# Patient Record
Sex: FEMALE | Race: WHITE | NOT HISPANIC OR LATINO | URBAN - METROPOLITAN AREA
[De-identification: names, ages, dates, MRNs, and addresses within clinical notes are randomized per-mention and may not be internally consistent; named-entity substitution may affect disease eponyms.]

---

## 2017-01-01 ENCOUNTER — INPATIENT (INPATIENT)
Facility: HOSPITAL | Age: 82
LOS: 1 days | DRG: 199 | End: 2017-07-09
Attending: INTERNAL MEDICINE | Admitting: INTERNAL MEDICINE
Payer: MEDICARE

## 2017-01-01 VITALS — RESPIRATION RATE: 16 BRPM | SYSTOLIC BLOOD PRESSURE: 100 MMHG | DIASTOLIC BLOOD PRESSURE: 70 MMHG | HEART RATE: 85 BPM

## 2017-01-01 VITALS — TEMPERATURE: 98 F | RESPIRATION RATE: 20 BRPM

## 2017-01-01 DIAGNOSIS — J94.2 HEMOTHORAX: ICD-10-CM

## 2017-01-01 DIAGNOSIS — R06.02 SHORTNESS OF BREATH: ICD-10-CM

## 2017-01-01 DIAGNOSIS — Z51.5 ENCOUNTER FOR PALLIATIVE CARE: ICD-10-CM

## 2017-01-01 DIAGNOSIS — I10 ESSENTIAL (PRIMARY) HYPERTENSION: ICD-10-CM

## 2017-01-01 DIAGNOSIS — I48.91 UNSPECIFIED ATRIAL FIBRILLATION: ICD-10-CM

## 2017-01-01 DIAGNOSIS — R53.81 OTHER MALAISE: ICD-10-CM

## 2017-01-01 DIAGNOSIS — I50.9 HEART FAILURE, UNSPECIFIED: ICD-10-CM

## 2017-01-01 LAB
ALBUMIN SERPL ELPH-MCNC: 1.9 G/DL — LOW (ref 3.3–5)
ALP SERPL-CCNC: 120 U/L — SIGNIFICANT CHANGE UP (ref 40–120)
ALT FLD-CCNC: 20 U/L RC — SIGNIFICANT CHANGE UP (ref 10–45)
ANION GAP SERPL CALC-SCNC: 18 MMOL/L — HIGH (ref 5–17)
APTT BLD: 39.8 SEC — HIGH (ref 27.5–37.4)
AST SERPL-CCNC: 46 U/L — HIGH (ref 10–40)
BASE EXCESS BLDV CALC-SCNC: -3 MMOL/L — LOW (ref -2–2)
BASOPHILS # BLD AUTO: 0 K/UL — SIGNIFICANT CHANGE UP (ref 0–0.2)
BASOPHILS NFR BLD AUTO: 0.1 % — SIGNIFICANT CHANGE UP (ref 0–2)
BILIRUB SERPL-MCNC: 0.4 MG/DL — SIGNIFICANT CHANGE UP (ref 0.2–1.2)
BLD GP AB SCN SERPL QL: NEGATIVE — SIGNIFICANT CHANGE UP
BUN SERPL-MCNC: 83 MG/DL — HIGH (ref 7–23)
CA-I SERPL-SCNC: 1.13 MMOL/L — SIGNIFICANT CHANGE UP (ref 1.12–1.3)
CALCIUM SERPL-MCNC: 8.2 MG/DL — LOW (ref 8.4–10.5)
CHLORIDE BLDV-SCNC: 102 MMOL/L — SIGNIFICANT CHANGE UP (ref 96–108)
CHLORIDE SERPL-SCNC: 97 MMOL/L — SIGNIFICANT CHANGE UP (ref 96–108)
CO2 BLDV-SCNC: 23 MMOL/L — SIGNIFICANT CHANGE UP (ref 22–30)
CO2 SERPL-SCNC: 18 MMOL/L — LOW (ref 22–31)
CREAT SERPL-MCNC: 2.41 MG/DL — HIGH (ref 0.5–1.3)
EOSINOPHIL # BLD AUTO: 0 K/UL — SIGNIFICANT CHANGE UP (ref 0–0.5)
EOSINOPHIL NFR BLD AUTO: 0.3 % — SIGNIFICANT CHANGE UP (ref 0–6)
GAS PNL BLDV: 133 MMOL/L — LOW (ref 136–145)
GAS PNL BLDV: SIGNIFICANT CHANGE UP
GAS PNL BLDV: SIGNIFICANT CHANGE UP
GLUCOSE BLDV-MCNC: 81 MG/DL — SIGNIFICANT CHANGE UP (ref 70–99)
GLUCOSE SERPL-MCNC: 77 MG/DL — SIGNIFICANT CHANGE UP (ref 70–99)
HCO3 BLDV-SCNC: 22 MMOL/L — SIGNIFICANT CHANGE UP (ref 21–29)
HCT VFR BLD CALC: 36 % — SIGNIFICANT CHANGE UP (ref 34.5–45)
HCT VFR BLDA CALC: 36 % — LOW (ref 39–50)
HGB BLD CALC-MCNC: 11.6 G/DL — SIGNIFICANT CHANGE UP (ref 11.5–15.5)
HGB BLD-MCNC: 12 G/DL — SIGNIFICANT CHANGE UP (ref 11.5–15.5)
INR BLD: 1.88 RATIO — HIGH (ref 0.88–1.16)
LACTATE BLDV-MCNC: 2 MMOL/L — SIGNIFICANT CHANGE UP (ref 0.7–2)
LYMPHOCYTES # BLD AUTO: 1.8 K/UL — SIGNIFICANT CHANGE UP (ref 1–3.3)
LYMPHOCYTES # BLD AUTO: 16 % — SIGNIFICANT CHANGE UP (ref 13–44)
MCHC RBC-ENTMCNC: 31 PG — SIGNIFICANT CHANGE UP (ref 27–34)
MCHC RBC-ENTMCNC: 33.3 GM/DL — SIGNIFICANT CHANGE UP (ref 32–36)
MCV RBC AUTO: 93.1 FL — SIGNIFICANT CHANGE UP (ref 80–100)
MONOCYTES # BLD AUTO: 0.3 K/UL — SIGNIFICANT CHANGE UP (ref 0–0.9)
MONOCYTES NFR BLD AUTO: 2.5 % — SIGNIFICANT CHANGE UP (ref 2–14)
NEUTROPHILS # BLD AUTO: 9.2 K/UL — HIGH (ref 1.8–7.4)
NEUTROPHILS NFR BLD AUTO: 81.2 % — HIGH (ref 43–77)
PCO2 BLDV: 40 MMHG — SIGNIFICANT CHANGE UP (ref 35–50)
PH BLDV: 7.35 — SIGNIFICANT CHANGE UP (ref 7.35–7.45)
PLAT MORPH BLD: NORMAL — SIGNIFICANT CHANGE UP
PLATELET # BLD AUTO: 525 K/UL — HIGH (ref 150–400)
PO2 BLDV: 28 MMHG — SIGNIFICANT CHANGE UP (ref 25–45)
POTASSIUM BLDV-SCNC: 4.1 MMOL/L — SIGNIFICANT CHANGE UP (ref 3.5–5)
POTASSIUM SERPL-MCNC: 5 MMOL/L — SIGNIFICANT CHANGE UP (ref 3.5–5.3)
POTASSIUM SERPL-SCNC: 5 MMOL/L — SIGNIFICANT CHANGE UP (ref 3.5–5.3)
PROT SERPL-MCNC: 7.3 G/DL — SIGNIFICANT CHANGE UP (ref 6–8.3)
PROTHROM AB SERPL-ACNC: 20.6 SEC — HIGH (ref 9.8–12.7)
RBC # BLD: 3.86 M/UL — SIGNIFICANT CHANGE UP (ref 3.8–5.2)
RBC # FLD: 16.4 % — HIGH (ref 10.3–14.5)
RBC BLD AUTO: SIGNIFICANT CHANGE UP
RH IG SCN BLD-IMP: POSITIVE — SIGNIFICANT CHANGE UP
SAO2 % BLDV: 46 % — LOW (ref 67–88)
SODIUM SERPL-SCNC: 133 MMOL/L — LOW (ref 135–145)
WBC # BLD: 11.4 K/UL — HIGH (ref 3.8–10.5)
WBC # FLD AUTO: 11.4 K/UL — HIGH (ref 3.8–10.5)

## 2017-01-01 PROCEDURE — 99285 EMERGENCY DEPT VISIT HI MDM: CPT | Mod: 25

## 2017-01-01 PROCEDURE — 80053 COMPREHEN METABOLIC PANEL: CPT

## 2017-01-01 PROCEDURE — 84132 ASSAY OF SERUM POTASSIUM: CPT

## 2017-01-01 PROCEDURE — 85027 COMPLETE CBC AUTOMATED: CPT

## 2017-01-01 PROCEDURE — 96375 TX/PRO/DX INJ NEW DRUG ADDON: CPT

## 2017-01-01 PROCEDURE — 82330 ASSAY OF CALCIUM: CPT

## 2017-01-01 PROCEDURE — 83605 ASSAY OF LACTIC ACID: CPT

## 2017-01-01 PROCEDURE — 71010: CPT | Mod: 26

## 2017-01-01 PROCEDURE — 85730 THROMBOPLASTIN TIME PARTIAL: CPT

## 2017-01-01 PROCEDURE — 96374 THER/PROPH/DIAG INJ IV PUSH: CPT

## 2017-01-01 PROCEDURE — 84295 ASSAY OF SERUM SODIUM: CPT

## 2017-01-01 PROCEDURE — 99233 SBSQ HOSP IP/OBS HIGH 50: CPT | Mod: GC

## 2017-01-01 PROCEDURE — 99285 EMERGENCY DEPT VISIT HI MDM: CPT

## 2017-01-01 PROCEDURE — 85014 HEMATOCRIT: CPT

## 2017-01-01 PROCEDURE — 82435 ASSAY OF BLOOD CHLORIDE: CPT

## 2017-01-01 PROCEDURE — 82947 ASSAY GLUCOSE BLOOD QUANT: CPT

## 2017-01-01 PROCEDURE — 86850 RBC ANTIBODY SCREEN: CPT

## 2017-01-01 PROCEDURE — 86900 BLOOD TYPING SEROLOGIC ABO: CPT

## 2017-01-01 PROCEDURE — 85610 PROTHROMBIN TIME: CPT

## 2017-01-01 PROCEDURE — 86901 BLOOD TYPING SEROLOGIC RH(D): CPT

## 2017-01-01 PROCEDURE — 99238 HOSP IP/OBS DSCHRG MGMT 30/<: CPT

## 2017-01-01 PROCEDURE — 71045 X-RAY EXAM CHEST 1 VIEW: CPT

## 2017-01-01 PROCEDURE — 99223 1ST HOSP IP/OBS HIGH 75: CPT | Mod: GC

## 2017-01-01 PROCEDURE — 82803 BLOOD GASES ANY COMBINATION: CPT

## 2017-01-01 RX ORDER — ACETAMINOPHEN 500 MG
1000 TABLET ORAL ONCE
Qty: 0 | Refills: 0 | Status: COMPLETED | OUTPATIENT
Start: 2017-01-01 | End: 2017-01-01

## 2017-01-01 RX ORDER — HYDROMORPHONE HYDROCHLORIDE 2 MG/ML
0.2 INJECTION INTRAMUSCULAR; INTRAVENOUS; SUBCUTANEOUS EVERY 4 HOURS
Qty: 0 | Refills: 0 | Status: DISCONTINUED | OUTPATIENT
Start: 2017-01-01 | End: 2017-01-01

## 2017-01-01 RX ORDER — ONDANSETRON 8 MG/1
4 TABLET, FILM COATED ORAL EVERY 6 HOURS
Qty: 0 | Refills: 0 | Status: COMPLETED | OUTPATIENT
Start: 2017-01-01 | End: 2017-01-01

## 2017-01-01 RX ORDER — ONDANSETRON 8 MG/1
4 TABLET, FILM COATED ORAL EVERY 6 HOURS
Qty: 0 | Refills: 0 | Status: DISCONTINUED | OUTPATIENT
Start: 2017-01-01 | End: 2017-01-01

## 2017-01-01 RX ORDER — METOPROLOL TARTRATE 50 MG
0 TABLET ORAL
Qty: 0 | Refills: 0 | COMMUNITY

## 2017-01-01 RX ORDER — FUROSEMIDE 40 MG
1 TABLET ORAL
Qty: 0 | Refills: 0 | COMMUNITY

## 2017-01-01 RX ORDER — HYDROMORPHONE HYDROCHLORIDE 2 MG/ML
0.5 INJECTION INTRAMUSCULAR; INTRAVENOUS; SUBCUTANEOUS
Qty: 0 | Refills: 0 | Status: DISCONTINUED | OUTPATIENT
Start: 2017-01-01 | End: 2017-01-01

## 2017-01-01 RX ORDER — PANTOPRAZOLE SODIUM 20 MG/1
80 TABLET, DELAYED RELEASE ORAL ONCE
Qty: 0 | Refills: 0 | Status: COMPLETED | OUTPATIENT
Start: 2017-01-01 | End: 2017-01-01

## 2017-01-01 RX ORDER — HYDROMORPHONE HYDROCHLORIDE 2 MG/ML
0.5 INJECTION INTRAMUSCULAR; INTRAVENOUS; SUBCUTANEOUS
Qty: 100 | Refills: 0 | Status: DISCONTINUED | OUTPATIENT
Start: 2017-01-01 | End: 2017-01-01

## 2017-01-01 RX ORDER — IBUPROFEN 200 MG
1 TABLET ORAL
Qty: 0 | Refills: 0 | COMMUNITY
Start: 2017-01-01 | End: 2017-07-13

## 2017-01-01 RX ORDER — PROTHROMBIN COMPLEX CONCENTRATE (HUMAN) 25.5; 16.5; 24; 22; 22; 26 [IU]/ML; [IU]/ML; [IU]/ML; [IU]/ML; [IU]/ML; [IU]/ML
1500 POWDER, FOR SOLUTION INTRAVENOUS ONCE
Qty: 1500 | Refills: 0 | Status: DISCONTINUED | OUTPATIENT
Start: 2017-01-01 | End: 2017-01-01

## 2017-01-01 RX ORDER — DOCUSATE SODIUM 100 MG
0 CAPSULE ORAL
Qty: 0 | Refills: 0 | COMMUNITY

## 2017-01-01 RX ORDER — FUROSEMIDE 40 MG
0 TABLET ORAL
Qty: 0 | Refills: 0 | COMMUNITY

## 2017-01-01 RX ORDER — ONDANSETRON 8 MG/1
4 TABLET, FILM COATED ORAL ONCE
Qty: 0 | Refills: 0 | Status: COMPLETED | OUTPATIENT
Start: 2017-01-01 | End: 2017-01-01

## 2017-01-01 RX ORDER — APIXABAN 2.5 MG/1
1 TABLET, FILM COATED ORAL
Qty: 0 | Refills: 0 | COMMUNITY

## 2017-01-01 RX ORDER — DILTIAZEM HCL 120 MG
1 CAPSULE, EXT RELEASE 24 HR ORAL
Qty: 0 | Refills: 0 | COMMUNITY

## 2017-01-01 RX ORDER — METOPROLOL TARTRATE 50 MG
1 TABLET ORAL
Qty: 0 | Refills: 0 | COMMUNITY

## 2017-01-01 RX ORDER — PROTHROMBIN COMPLEX CONCENTRATE (HUMAN) 25.5; 16.5; 24; 22; 22; 26 [IU]/ML; [IU]/ML; [IU]/ML; [IU]/ML; [IU]/ML; [IU]/ML
2050 POWDER, FOR SOLUTION INTRAVENOUS ONCE
Qty: 2050 | Refills: 0 | Status: DISCONTINUED | OUTPATIENT
Start: 2017-01-01 | End: 2017-01-01

## 2017-01-01 RX ORDER — APIXABAN 2.5 MG/1
0 TABLET, FILM COATED ORAL
Qty: 0 | Refills: 0 | COMMUNITY

## 2017-01-01 RX ORDER — PROTHROMBIN COMPLEX CONCENTRATE (HUMAN) 25.5; 16.5; 24; 22; 22; 26 [IU]/ML; [IU]/ML; [IU]/ML; [IU]/ML; [IU]/ML; [IU]/ML
2000 POWDER, FOR SOLUTION INTRAVENOUS ONCE
Qty: 2000 | Refills: 0 | Status: COMPLETED | OUTPATIENT
Start: 2017-01-01 | End: 2017-01-01

## 2017-01-01 RX ORDER — ACETAMINOPHEN WITH CODEINE 300MG-30MG
1 TABLET ORAL
Qty: 0 | Refills: 0 | COMMUNITY
Start: 2017-01-01 | End: 2017-07-13

## 2017-01-01 RX ORDER — SODIUM CHLORIDE 9 MG/ML
1000 INJECTION INTRAMUSCULAR; INTRAVENOUS; SUBCUTANEOUS ONCE
Qty: 0 | Refills: 0 | Status: COMPLETED | OUTPATIENT
Start: 2017-01-01 | End: 2017-01-01

## 2017-01-01 RX ORDER — ROBINUL 0.2 MG/ML
0.4 INJECTION INTRAMUSCULAR; INTRAVENOUS ONCE
Qty: 0 | Refills: 0 | Status: COMPLETED | OUTPATIENT
Start: 2017-01-01 | End: 2017-01-01

## 2017-01-01 RX ORDER — PANTOPRAZOLE SODIUM 20 MG/1
1 TABLET, DELAYED RELEASE ORAL
Qty: 0 | Refills: 0 | COMMUNITY
Start: 2017-01-01 | End: 2017-07-16

## 2017-01-01 RX ORDER — ACETAMINOPHEN 500 MG
2 TABLET ORAL
Qty: 0 | Refills: 0 | COMMUNITY

## 2017-01-01 RX ORDER — ACETAMINOPHEN 500 MG
650 TABLET ORAL EVERY 6 HOURS
Qty: 0 | Refills: 0 | Status: DISCONTINUED | OUTPATIENT
Start: 2017-01-01 | End: 2017-01-01

## 2017-01-01 RX ORDER — ROBINUL 0.2 MG/ML
0.4 INJECTION INTRAMUSCULAR; INTRAVENOUS EVERY 6 HOURS
Qty: 0 | Refills: 0 | Status: DISCONTINUED | OUTPATIENT
Start: 2017-01-01 | End: 2017-01-01

## 2017-01-01 RX ORDER — SODIUM CHLORIDE 9 MG/ML
1000 INJECTION INTRAMUSCULAR; INTRAVENOUS; SUBCUTANEOUS
Qty: 0 | Refills: 0 | Status: DISCONTINUED | OUTPATIENT
Start: 2017-01-01 | End: 2017-01-01

## 2017-01-01 RX ORDER — HYDROMORPHONE HYDROCHLORIDE 2 MG/ML
1 INJECTION INTRAMUSCULAR; INTRAVENOUS; SUBCUTANEOUS
Qty: 0 | Refills: 0 | Status: DISCONTINUED | OUTPATIENT
Start: 2017-01-01 | End: 2017-01-01

## 2017-01-01 RX ORDER — HYDROMORPHONE HYDROCHLORIDE 2 MG/ML
0.2 INJECTION INTRAMUSCULAR; INTRAVENOUS; SUBCUTANEOUS ONCE
Qty: 0 | Refills: 0 | Status: DISCONTINUED | OUTPATIENT
Start: 2017-01-01 | End: 2017-01-01

## 2017-01-01 RX ADMIN — HYDROMORPHONE HYDROCHLORIDE 0.2 MILLIGRAM(S): 2 INJECTION INTRAMUSCULAR; INTRAVENOUS; SUBCUTANEOUS at 15:29

## 2017-01-01 RX ADMIN — Medication 400 MILLIGRAM(S): at 13:01

## 2017-01-01 RX ADMIN — SODIUM CHLORIDE 75 MILLILITER(S): 9 INJECTION INTRAMUSCULAR; INTRAVENOUS; SUBCUTANEOUS at 07:40

## 2017-01-01 RX ADMIN — SODIUM CHLORIDE 75 MILLILITER(S): 9 INJECTION INTRAMUSCULAR; INTRAVENOUS; SUBCUTANEOUS at 04:44

## 2017-01-01 RX ADMIN — HYDROMORPHONE HYDROCHLORIDE 0.5 MG/HR: 2 INJECTION INTRAMUSCULAR; INTRAVENOUS; SUBCUTANEOUS at 11:42

## 2017-01-01 RX ADMIN — HYDROMORPHONE HYDROCHLORIDE 0.5 MILLIGRAM(S): 2 INJECTION INTRAMUSCULAR; INTRAVENOUS; SUBCUTANEOUS at 20:40

## 2017-01-01 RX ADMIN — Medication 0.25 MILLIGRAM(S): at 16:23

## 2017-01-01 RX ADMIN — HYDROMORPHONE HYDROCHLORIDE 0.5 MILLIGRAM(S): 2 INJECTION INTRAMUSCULAR; INTRAVENOUS; SUBCUTANEOUS at 01:19

## 2017-01-01 RX ADMIN — ONDANSETRON 4 MILLIGRAM(S): 8 TABLET, FILM COATED ORAL at 16:29

## 2017-01-01 RX ADMIN — HYDROMORPHONE HYDROCHLORIDE 0.5 MILLIGRAM(S): 2 INJECTION INTRAMUSCULAR; INTRAVENOUS; SUBCUTANEOUS at 08:16

## 2017-01-01 RX ADMIN — HYDROMORPHONE HYDROCHLORIDE 0.5 MG/HR: 2 INJECTION INTRAMUSCULAR; INTRAVENOUS; SUBCUTANEOUS at 20:08

## 2017-01-01 RX ADMIN — SODIUM CHLORIDE 75 MILLILITER(S): 9 INJECTION INTRAMUSCULAR; INTRAVENOUS; SUBCUTANEOUS at 21:00

## 2017-01-01 RX ADMIN — SODIUM CHLORIDE 75 MILLILITER(S): 9 INJECTION INTRAMUSCULAR; INTRAVENOUS; SUBCUTANEOUS at 19:59

## 2017-01-01 RX ADMIN — HYDROMORPHONE HYDROCHLORIDE 0.5 MILLIGRAM(S): 2 INJECTION INTRAMUSCULAR; INTRAVENOUS; SUBCUTANEOUS at 06:10

## 2017-01-01 RX ADMIN — ONDANSETRON 4 MILLIGRAM(S): 8 TABLET, FILM COATED ORAL at 15:28

## 2017-01-01 RX ADMIN — HYDROMORPHONE HYDROCHLORIDE 0.5 MILLIGRAM(S): 2 INJECTION INTRAMUSCULAR; INTRAVENOUS; SUBCUTANEOUS at 20:53

## 2017-01-01 RX ADMIN — PROTHROMBIN COMPLEX CONCENTRATE (HUMAN) 400 INTERNATIONAL UNIT(S): 25.5; 16.5; 24; 22; 22; 26 POWDER, FOR SOLUTION INTRAVENOUS at 15:31

## 2017-01-01 RX ADMIN — HYDROMORPHONE HYDROCHLORIDE 0.2 MILLIGRAM(S): 2 INJECTION INTRAMUSCULAR; INTRAVENOUS; SUBCUTANEOUS at 10:30

## 2017-01-01 RX ADMIN — ROBINUL 0.4 MILLIGRAM(S): 0.2 INJECTION INTRAMUSCULAR; INTRAVENOUS at 16:26

## 2017-01-01 RX ADMIN — SODIUM CHLORIDE 1000 MILLILITER(S): 9 INJECTION INTRAMUSCULAR; INTRAVENOUS; SUBCUTANEOUS at 13:40

## 2017-01-01 RX ADMIN — PANTOPRAZOLE SODIUM 80 MILLIGRAM(S): 20 TABLET, DELAYED RELEASE ORAL at 13:39

## 2017-01-01 RX ADMIN — HYDROMORPHONE HYDROCHLORIDE 0.5 MG/HR: 2 INJECTION INTRAMUSCULAR; INTRAVENOUS; SUBCUTANEOUS at 19:58

## 2017-01-01 RX ADMIN — HYDROMORPHONE HYDROCHLORIDE 0.5 MILLIGRAM(S): 2 INJECTION INTRAMUSCULAR; INTRAVENOUS; SUBCUTANEOUS at 01:04

## 2017-01-01 RX ADMIN — HYDROMORPHONE HYDROCHLORIDE 0.2 MILLIGRAM(S): 2 INJECTION INTRAMUSCULAR; INTRAVENOUS; SUBCUTANEOUS at 05:26

## 2017-07-07 NOTE — ED PROVIDER NOTE - CRITICAL CARE PROVIDED
consult w/ pt's family directly relating to pts condition/direct patient care (not related to procedure)/conducted a detailed discussion of DNR status

## 2017-07-07 NOTE — ED PROVIDER NOTE - PROGRESS NOTE DETAILS
ATTD: I spoke with son at the bedside and other son (who is a physician) by phone and discussed the patient. plan to reverse eliquis. not endosocopy at this time. ATTD: I spoke with son at the bedside and other son (who is a physician) by phone and discussed the patient. plan to reverse eliquis. not endosocopy at this time. Dr. Anil vo can be reached at [phone].

## 2017-07-07 NOTE — H&P ADULT - PROBLEM SELECTOR PLAN 6
pt with traumatic rib fractures and likely hemothorax to left side causing significant pain, dyspnea, and anxiety. pts goals are to be comfortable and not to pursue any further aggressive interventions according to her wishes. despite her pain and hypoxia, pt is alert and oriented and able to converse and answer all questions. 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 pt and family understand that her prognosis is likely hours to days due to the severity of her injury and her underlying illnesses.

## 2017-07-07 NOTE — H&P ADULT - NSHPLABSRESULTS_GEN_ALL_CORE
INTERPRETATION:  A single chest x-ray was obtained on July 7, 2017.    Indication: Fever. Rule out pneumonia.    Impression:    The heart is enlarged. Complete opacification left hemithorax secondary   to pleural effusion and collapse lung. The right lung is clear. No   previous films for comparison are available. If clinically warranted CT   scan should be obtained. A pacer is in good position.

## 2017-07-07 NOTE — ED PROVIDER NOTE - PHYSICAL EXAMINATION
Gen: AAO x 3, lethargic  Skin: No rashes or lesions  HEENT: NC/AT, PERRLA, EOMI, MMM.  dried blood around the mouth  Resp: unlabored. CTA on the right.  dull sounds to the left  Cardiac: rrr s1s2, no murmurs, rubs or gallops  GI: ND, +BS, Soft, NT  Ext: no pedal edema, FROM in all extremities  Neuro: no focal deficits

## 2017-07-07 NOTE — ED ADULT NURSE NOTE - CHIEF COMPLAINT
The patient is a 89y Female complaining of The patient is a 89y Female complaining of rib pain, vomiting blood

## 2017-07-07 NOTE — ED ADULT NURSE NOTE - PMH
Anemia    Atrial fibrillation    CAD (coronary artery disease)    CHF (congestive heart failure)    HTN (hypertension)    Osteoarthritis    Pacemaker    Septic arthritis

## 2017-07-07 NOTE — ED PROVIDER NOTE - MEDICAL DECISION MAKING DETAILS
ATTD: pain / bleeding / hypotension / and on anti coga. will check labs, xray, reversal of her eliquis given concern for lfe threatening bleeding. will give fluids, pain medication, transfuse prn. admit to  hospital for further care. I discussed with son her prognosis. she is DNR DNI and will continue as that.

## 2017-07-07 NOTE — ED ADULT NURSE NOTE - OBJECTIVE STATEMENT
Pt bib EMS from Assisted living facility for eval of continued rib pain and was noted to be vomiting blood this morning. Oral mucosa dry, cracked with dried blood evident on lips. Pt is alert and interactive.

## 2017-07-07 NOTE — H&P ADULT - NSHPPHYSICALEXAM_GEN_ALL_CORE
Physical Exam:   constitutional - ill appearing, awake and alert, oriented x3  head - no external evidence of trauma  cvs - rrr, no murmurs, mod peripheral edema  resp - breath sounds significantly diminished on left side, tachypnea, rales bilat, crackles left greater than right   gi - abdomen soft and nontender, no rigidity, guarding or rebound, bowel sounds present, healed surgical scars, abdominal hernia  msk - moving all extremities spontaneously  neuro - alert and oriented x3, no focal deficits, CNs 2-12 grossly intact  skin- no jaundice, warm and dry  psych - mood and affect wnl, no apparent risk to self or others

## 2017-07-07 NOTE — H&P ADULT - HISTORY OF PRESENT ILLNESS
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 pt was living at home until approx 6 months ago when she had an episode of pna and gastroenteritis leading to hospital admission. pt was subsequently weak and deconditioned after her prolonged hospitalization, thus was placed at [facility]. pt was doing well until 4 days ago when she began to complain of back pain for which the team at the [facility] ordered an xray. during the process of obtaining the xray, pt injured her left ribs. since then pt has been c/o left thoracic pain, sob, and intermittent hemoptysis. pt was evaluated in the ED and found to be hypotensive, hypoxic on 02 via NC, and tachypneic. xray showed complete atx of left lung likely secondary to pleural effusion ? possible hemothorax. discussions with family were had by ED MD as well as palliative care team in which family expressed goals for comfort measures; stated invasive procedures or further testing to elucidate whether component of hemothorax present was not within her goals of care. pt is being admitted to PCU for pain and sxs control for her significant pain and dyspnea with prognosis of likely days to weeks.

## 2017-07-07 NOTE — ED PROVIDER NOTE - OBJECTIVE STATEMENT
89 yo female with PMHx of CHF, Afib s/p PPM, HTN, CAD, anemia presenting from [facility] with rib pain and hematemesis. 89 yo female with PMHx of CHF, Afib s/p PPM on eliquis, HTN, CAD, anemia presenting from [place] with rib pain and hematemesis.  The patient was c/o left rib pain over the past 3 days.  had a chest xray as outpatient which showed rib fracture.  For the past day staff has noticed her vomiting up blood.  Denies head trauma, fall, fevers/chills, CP, SOB, abd pain. Patient is DNR/DNI.

## 2017-07-07 NOTE — H&P ADULT - ASSESSMENT
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 pt was living at home until approx 6 months ago when she had an episode of pna and gastroenteritis leading to hospital admission. pt was subsequently weak and deconditioned after her prolonged hospitalization, thus was placed at [facility]. pt was doing well until 4 days ago when she began to complain of back pain for which the team at the [facility] ordered an xray. during the process of obtaining the xray, pt injured her left ribs. since then pt has been c/o left thoracic pain, sob, and intermittent hemoptysis. pt was evaluated in the ED and found to be hypotensive, hypoxic on 02 via NC, and tachypneic. xray showed complete atx of left lung likely secondary to pleural effusion ? possible hemothorax. discussions with family were had by ED MD as well as palliative care team in which family expressed goals for comfort measures; stated invasive procedures or further testing to elucidate whether component of hemothorax present was not within her goals of care. pt is being admitted to PCU for pain and sxs control for her significant pain and dyspnea with prognosis of likely days to weeks.

## 2017-07-07 NOTE — H&P ADULT - PROBLEM SELECTOR PLAN 2
pt with chronic htn on toprol xl 100 mg qd, lasix 40 qd + lasix 40 / 20 alternating every other night, cardizem cd 360 mg qhs at baseline. these medications are being held at this time due to hypotension presumably from hemothorax.

## 2017-07-07 NOTE — H&P ADULT - NSHPREVIEWOFSYSTEMS_GEN_ALL_CORE
ROS:   constitutional - no fever, no chills  eyes - no visual changes, no redness  eent - no sore throat, no nasal congestion  cvs - no chest pain, no leg swelling  resp - + shortness of breath, no cough  gi - no abdominal pain, no vomiting, no diarrhea  gu - no dysuria, no hematuria  msk - no acute back pain, no joint swelling  skin - no rashes, no jaundice  neuro - no headache, no focal weakness  psych - no acute mental health issue

## 2017-07-07 NOTE — H&P ADULT - PROBLEM SELECTOR PLAN 1
pt with complete atx/pleural effusion and likely underlying hemothorax on left side secondary to traumatic rib fractures. pt with significant hypoxia, intermittent hemoptysis, and significant pain/dyspnea secondary to these issues. as per goals of care conversation - no further intervention or diagnostic imaging to further elucidate nature of her injuries.   - start dilaudid 0.2 mg iv q4 atc + dilaudid 0.5 mg iv q1 prn pain and/or dyspnea  - continue 02 via nc at 4L  - ativan 0.25 mg iv q6 prn anxiety and/or agitation

## 2017-07-07 NOTE — ED PROVIDER NOTE - ATTENDING CONTRIBUTION TO CARE
89 y/o f with pmhx Anemia, a fib on Eliquis, CAD, CHF, HTN, osteoarthritis, PPM presents from [facility] for concern for increase pain on ribs and oral bleeding. son at the bedside. no fever no chills. no abd pain. Had Xray done at assisted living which revealed rib fractures. no blood per rectum reported from NH.   Gen.mild distress from pain. no acute resp distress, Non toxic   HEENT: EOMI, mmm, dried blood on lips and tongue.   Lungs: CTAB/L no C/ W /R   CVS: S1S2   Abd; Soft non tender, non distended   Ext: no edema   Neuro AAOx3 non focal clear speech

## 2017-07-08 NOTE — PROGRESS NOTE ADULT - SUBJECTIVE AND OBJECTIVE BOX
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 pt was living at home until approx 6 months ago when she had an episode of pna and gastroenteritis leading to hospital admission. pt was subsequently weak and deconditioned after her prolonged hospitalization, thus was placed at [facility]. pt was doing well until 4 days ago when she began to complain of back pain for which the team at the care home ordered an xray. during the process of obtaining the xray, pt injured her left ribs. since then pt has been c/o left thoracic pain, sob, and intermittent hemoptysis. pt was evaluated in the ED and found to be hypotensive, hypoxic on 02 via NC, and tachypneic. xray showed complete atx of left lung likely secondary to pleural effusion ? possible hemothorax. discussions with family were had by ED MD as well as palliative care team in which family expressed goals for comfort measures; stated invasive procedures or further testing to elucidate whether component of hemothorax present was not within her goals of care. pt is being admitted to PCU for pain and sxs control for her significant pain and dyspnea with prognosis of likely days to weeks. (07 Jul 2017 18:49)    Indication for Palliative Care Unit Services: Used 4 PRN's in 24H; used PRN zofran for nausea and one ativan     ADVANCE DIRECTIVES:    DNR [x ] YES [ ] NO                            [ ] Completed  MOLST  [ ] YES [ x] NO                      [ ] Completed  Health Care Proxy [x ] YES  [ ] NO   [ ] Completed  Living Will  [x ] YES [ ] NO               Allergies    No Known Allergies    Intolerances        MEDICATIONS  (STANDING):  sodium chloride 0.9%. 1000 milliLiter(s) (75 mL/Hr) IV Continuous <Continuous>  HYDROmorphone Infusion 0.5 mG/Hr (0.5 mL/Hr) IV Continuous <Continuous>    MEDICATIONS  (PRN):  HYDROmorphone  Injectable 0.5 milliGRAM(s) IV Push every 1 hour PRN dyspnea  acetaminophen  Suppository 650 milliGRAM(s) Rectal every 6 hours PRN For Temp greater than 38 C (100.4 F)  glycopyrrolate Injectable 0.4 milliGRAM(s) IV Push every 6 hours PRN secretions  LORazepam   Injectable 0.5 milliGRAM(s) IV Push every 1 hour PRN anxiety and / or agitation  HYDROmorphone  Injectable 1 milliGRAM(s) IV Push every 1 hour PRN Breakthrough pain      PRESENT SYMPTOMS:  Source: [ ] Patient   [ ] Family   [x ] Team     Pain:                        [ x] No [ ] Yes             [ ] Mild [ ] Moderate [ ] Severe    Onset -  Location -  Duration -  Character -  Alleviating/Aggravating -  Radiation -  Timing -      Dyspnea:                [ ] No [ x] Yes             [ ] Mild [ ] Moderate [ x] Severe    Anxiety:                  [x ] No [ ] Yes             [ ] Mild [ ] Moderate [ ] Severe    Fatigue:                  [ ] No [x ] Yes             [ ] Mild [ ] Moderate [ x] Severe    Nausea:                  [x ] No [ ] Yes             [ ] Mild [ ] Moderate [ ] Severe    Loss of appetite:   [ ] No [ x] Yes             [ ] Mild [ ] Moderate [x ] Severe    Constipation:        [x ] No [ ] Yes             [ ] Mild [ ] Moderate [ ] Severe    Other Symptoms:  [ ] All other review of systems negative   [ x] Unable to obtain due to poor mentation     Karnofsky Performance Score/Palliative Performance Status Version 2:    20     %    PHYSICAL EXAM:  Vital Signs Last 24 Hrs  T(C): 36.8 (08 Jul 2017 08:34), Max: 36.8 (08 Jul 2017 08:34)  T(F): 98.3 (08 Jul 2017 08:34), Max: 98.3 (08 Jul 2017 08:34)  HR: 124 (08 Jul 2017 06:17) (100 - 124)  BP: 83/56 (07 Jul 2017 19:31) (83/56 - 85/48)  BP(mean): --  RR: 20 (08 Jul 2017 08:34) (20 - 20)  SpO2: 100% (08 Jul 2017 06:17) (92% - 100%) I&O's Summary    07 Jul 2017 07:01  -  08 Jul 2017 07:00  --------------------------------------------------------  IN: 2000 mL / OUT: 0 mL / NET: 2000 mL        General:  [ ] Alert  [ ] Oriented x      [ ] Lethargic  [ ] Agitated   [x ] Cachexia   [x ] Unarousable  [ ] Verbal  [ ] Non-Verbal    HEENT:  [x ] Normal   [ ] Dry mouth   [ ] ET Tube    [ ] Trach  [ ] Oral lesions    Lungs:   [ ] Clear [ x] Tachypnea  [ ] Audible excessive secretions   [ ] Rhonchi        [ ] Right [ ] Left [ ] Bilateral  [ ] Crackles        [ ] Right [ ] Left [ ] Bilateral  [ ] Wheezing     [ ] Right [ ] Left [ ] Bilateral  [ ] Respiratory failure [ ] Acute [ ] Chronic [ ] Hypoxemic [ ] Hypercarbic [ ] Other    Cardiovascular:   [x ] Regular [ ] Irregular [ ] Tachycardia   [ ] Bradycardia  [ ] Murmur [ ] Other  [ ] Shock [ ] Septic [ ] Hypovolemic [ ] Neurogenic [ ] Cardiogenic   [ ] Vasopressors [ ] Inotrophs    Abdomen:   [x ] Soft  [x ] Distended   [ ] +BS  [ ] Non tender [ ] Tender  [ ]PEG   [ ]OGT/ NGT   Last BM:     [ ] Other    Genitourinary:  [ ] Normal [ ] Incontinent   [ ] Oliguria/Anuria   [x ] Domingo  [ ] Other       Musculoskeletal:    [ ] Normal   [ ] Weakness  [ ] Edema  [ x] Bedbound/Wheelchair bound [ ]  Ambulatory [ ] with [ ] without assistance [ x] Functional quadriplegia    Neurological: [ ] No focal deficits  [ ] Cognitive impairment  [ ] Dysphagia [ ] Dysarthria [ ] Paresis [ ] Other   [ ] Brain compression  [ ] Cerebral edema [ x] Encephalopathy    Skin: [ ] Normal   [ ] Ulcer(s) type [ ] Diabetic [ ] Pressure [ ] Other   Stage        POA [ ]  Yes [ ]  No       [ ] Rash  Pt lower ext is mottled    LABS:                        12.0   11.4  )-----------( 525      ( 07 Jul 2017 13:00 )             36.0     07-07    133<L>  |  97  |  83<H>  ----------------------------<  77  5.0   |  18<L>  |  2.41<H>    Ca    8.2<L>      07 Jul 2017 13:00    TPro  7.3  /  Alb  1.9<L>  /  TBili  0.4  /  DBili  x   /  AST  46<H>  /  ALT  20  /  AlkPhos  120  07-07    PT/INR - ( 07 Jul 2017 13:00 )   PT: 20.6 sec;   INR: 1.88 ratio         PTT - ( 07 Jul 2017 13:00 )  PTT:39.8 sec      Protein Calorie Malnutrition: [ ] Mild [ ] Moderate [ ] Severe    Oral Intake: [ ] Unable/mouth care only [ ] Minimal [ ] Moderate [ ] Full Capability  Diet: [ ] NPO [ ] Tube feeds [ ] TPN [ ] Other     RADIOLOGY & ADDITIONAL STUDIES:    REFERRALS:   [ ] Chaplaincy  [ ] Hospice Care  [ ] Child Life  [ ] Social Work  [ ] Case management [ ] Nutrition [ ] Holistic Therapy [ ] Wound Care [ ]Physical Therapy [ ] Other [ ]See goals of care note in [place]

## 2017-07-08 NOTE — PROGRESS NOTE ADULT - ASSESSMENT
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 pt was living at home until approx 6 months ago when she had an episode of pna and gastroenteritis leading to hospital admission. pt was subsequently weak and deconditioned after her prolonged hospitalization, thus was placed at [facility]. pt was doing well until 4 days ago when she began to complain of back pain for which the team at the [facility] ordered an xray. during the process of obtaining the xray, pt injured her left ribs. since then pt has been c/o left thoracic pain, sob, and intermittent hemoptysis. pt was evaluated in the ED and found to be hypotensive, hypoxic on 02 via NC, and tachypneic. xray showed complete atx of left lung likely secondary to pleural effusion ? possible hemothorax. discussions with family were had by ED MD as well as palliative care team in which family expressed goals for comfort measures; stated invasive procedures or further testing to elucidate whether component of hemothorax present was not within her goals of care. pt is being admitted to PCU for pain and sxs control for her significant pain and dyspnea with prognosis of likely days to weeks.   Pt is actively dying

## 2017-07-09 NOTE — PROVIDER CONTACT NOTE (OTHER) - RECOMMENDATIONS
Pt pronounced dead at 0225, sons Kareem and Jaleel called. Message left on voicemail to call unit Pt pronounced dead at 0225, son Jaleel was called and he declined autopsy.

## 2017-07-09 NOTE — DISCHARGE NOTE FOR THE EXPIRED PATIENT - HOSPITAL COURSE
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 She was subsequently weak and deconditioned after her prolonged hospitalization, thus was placed at [facility]. She was doing well until 4 days PTA, when she began to complain of back pain for which the team at the nursing home ordered an xray, during  which sje injured her left ribs. Since then pt has been c/o left thoracic pain, sob, and intermittent hemoptysis. pt was evaluated in the ED and found to be hypotensive, hypoxic on 02 via NC, and tachypneic; xray showed complete atx of left lung likely secondary to pleural effusion, likely hemothorax. Followed extensive discussions in the ED with family and patient goals for comfort measures were expressed. She stated invasive procedures or further testing to elucidate whether component of hemothorax present was not within her goals of care. She was subsequently admitted to PCU for pain and sxs control for her significant pain and dyspnea with prognosis of likely days to weeks. On early morning of , she was found  by RN. Son was called and notified. Autopsy was declined.

## 2017-07-09 NOTE — PROVIDER CONTACT NOTE (OTHER) - ASSESSMENT
No response to external stimuli, no spontaneous respirations, no apical heart rate, negative papillary response to light